# Patient Record
Sex: MALE | Race: WHITE | NOT HISPANIC OR LATINO | URBAN - METROPOLITAN AREA
[De-identification: names, ages, dates, MRNs, and addresses within clinical notes are randomized per-mention and may not be internally consistent; named-entity substitution may affect disease eponyms.]

---

## 2019-06-21 ENCOUNTER — OUTPATIENT (OUTPATIENT)
Dept: OUTPATIENT SERVICES | Facility: HOSPITAL | Age: 34
LOS: 1 days | End: 2019-06-21
Payer: COMMERCIAL

## 2019-06-21 VITALS
HEIGHT: 71 IN | OXYGEN SATURATION: 99 % | SYSTOLIC BLOOD PRESSURE: 116 MMHG | HEART RATE: 58 BPM | WEIGHT: 169.98 LBS | DIASTOLIC BLOOD PRESSURE: 66 MMHG | RESPIRATION RATE: 16 BRPM | TEMPERATURE: 98 F

## 2019-06-21 DIAGNOSIS — Z98.52 VASECTOMY STATUS: Chronic | ICD-10-CM

## 2019-06-21 DIAGNOSIS — Z01.818 ENCOUNTER FOR OTHER PREPROCEDURAL EXAMINATION: ICD-10-CM

## 2019-06-21 DIAGNOSIS — N50.89 OTHER SPECIFIED DISORDERS OF THE MALE GENITAL ORGANS: ICD-10-CM

## 2019-06-21 PROCEDURE — G0463: CPT

## 2019-06-21 RX ORDER — LIDOCAINE HCL 20 MG/ML
0.2 VIAL (ML) INJECTION ONCE
Refills: 0 | Status: DISCONTINUED | OUTPATIENT
Start: 2019-06-26 | End: 2019-07-11

## 2019-06-21 RX ORDER — CHLORHEXIDINE GLUCONATE 213 G/1000ML
1 SOLUTION TOPICAL DAILY
Refills: 0 | Status: DISCONTINUED | OUTPATIENT
Start: 2019-06-26 | End: 2019-07-11

## 2019-06-21 RX ORDER — SODIUM CHLORIDE 9 MG/ML
3 INJECTION INTRAMUSCULAR; INTRAVENOUS; SUBCUTANEOUS EVERY 8 HOURS
Refills: 0 | Status: DISCONTINUED | OUTPATIENT
Start: 2019-06-26 | End: 2019-07-11

## 2019-06-21 NOTE — H&P PST ADULT - NSICDXPROBLEM_GEN_ALL_CORE_FT
PROBLEM DIAGNOSES  Problem: Other specified disorders of the male genital organs  Assessment and Plan: Bilateral microsurgical epididymal sperm aspiration, PSt instruction given , no blood test done in PSt  has appointment with Dr Sena today for blood test as stated by patient

## 2019-06-21 NOTE — H&P PST ADULT - NSANTHOSAYNRD_GEN_A_CORE
No. EUNICE screening performed.  STOP BANG Legend: 0-2 = LOW Risk; 3-4 = INTERMEDIATE Risk; 5-8 = HIGH Risk

## 2019-06-21 NOTE — H&P PST ADULT - HISTORY OF PRESENT ILLNESS
34 year old male came in with wife for PSt today for bilateral microsurgical epididymal sperm aspiration. Patient has hx of vasectomy 2012 , decided to have more children and was referred to Dr Sena , evaluated and now scheduled this procedure for surgical intervention/ treatment.

## 2019-06-25 ENCOUNTER — TRANSCRIPTION ENCOUNTER (OUTPATIENT)
Age: 34
End: 2019-06-25

## 2019-06-25 RX ORDER — ONDANSETRON 8 MG/1
4 TABLET, FILM COATED ORAL ONCE
Refills: 0 | Status: DISCONTINUED | OUTPATIENT
Start: 2019-06-26 | End: 2019-07-11

## 2019-06-25 RX ORDER — OXYCODONE HYDROCHLORIDE 5 MG/1
5 TABLET ORAL ONCE
Refills: 0 | Status: DISCONTINUED | OUTPATIENT
Start: 2019-06-26 | End: 2019-06-26

## 2019-06-25 RX ORDER — CELECOXIB 200 MG/1
200 CAPSULE ORAL ONCE
Refills: 0 | Status: DISCONTINUED | OUTPATIENT
Start: 2019-06-26 | End: 2019-07-11

## 2019-06-25 RX ORDER — SODIUM CHLORIDE 9 MG/ML
1000 INJECTION, SOLUTION INTRAVENOUS
Refills: 0 | Status: DISCONTINUED | OUTPATIENT
Start: 2019-06-26 | End: 2019-07-11

## 2019-06-26 ENCOUNTER — APPOINTMENT (OUTPATIENT)
Dept: HUMAN REPRODUCTION | Facility: CLINIC | Age: 34
End: 2019-06-26
Payer: COMMERCIAL

## 2019-06-26 ENCOUNTER — TRANSCRIPTION ENCOUNTER (OUTPATIENT)
Age: 34
End: 2019-06-26

## 2019-06-26 ENCOUNTER — OUTPATIENT (OUTPATIENT)
Dept: OUTPATIENT SERVICES | Facility: HOSPITAL | Age: 34
LOS: 1 days | End: 2019-06-26
Payer: COMMERCIAL

## 2019-06-26 VITALS
SYSTOLIC BLOOD PRESSURE: 126 MMHG | DIASTOLIC BLOOD PRESSURE: 78 MMHG | TEMPERATURE: 97 F | OXYGEN SATURATION: 100 % | HEART RATE: 64 BPM | RESPIRATION RATE: 18 BRPM

## 2019-06-26 VITALS
RESPIRATION RATE: 18 BRPM | DIASTOLIC BLOOD PRESSURE: 66 MMHG | SYSTOLIC BLOOD PRESSURE: 111 MMHG | OXYGEN SATURATION: 100 % | TEMPERATURE: 98 F | HEART RATE: 55 BPM | HEIGHT: 71 IN | WEIGHT: 169.98 LBS

## 2019-06-26 DIAGNOSIS — Z98.52 VASECTOMY STATUS: Chronic | ICD-10-CM

## 2019-06-26 DIAGNOSIS — N50.89 OTHER SPECIFIED DISORDERS OF THE MALE GENITAL ORGANS: ICD-10-CM

## 2019-06-26 PROCEDURE — 89264 IDENTIFY SPERM TISSUE: CPT

## 2019-06-26 PROCEDURE — 89344 STORAGE/YEAR REPROD TISSUE: CPT

## 2019-06-26 PROCEDURE — 89335 CRYOPRESERVE TESTICULAR TISS: CPT

## 2019-06-26 PROCEDURE — 89257 SPERM IDENTIFICATION: CPT | Mod: 59

## 2019-06-26 RX ORDER — ACETAMINOPHEN 500 MG
1000 TABLET ORAL ONCE
Refills: 0 | Status: COMPLETED | OUTPATIENT
Start: 2019-06-26 | End: 2019-06-26

## 2019-06-26 RX ORDER — CELECOXIB 200 MG/1
200 CAPSULE ORAL ONCE
Refills: 0 | Status: COMPLETED | OUTPATIENT
Start: 2019-06-26 | End: 2019-06-26

## 2019-06-26 RX ADMIN — Medication 1000 MILLIGRAM(S): at 10:38

## 2019-06-26 RX ADMIN — CELECOXIB 200 MILLIGRAM(S): 200 CAPSULE ORAL at 10:38

## 2019-06-26 NOTE — ASU DISCHARGE PLAN (ADULT/PEDIATRIC) - CALL YOUR DOCTOR IF YOU HAVE ANY OF THE FOLLOWING:
Swelling that gets worse/Nausea and vomiting that does not stop/Pain not relieved by Medications/Inability to tolerate liquids or foods/Fever greater than (need to indicate Fahrenheit or Celsius)/Bleeding that does not stop/Wound/Surgical Site with redness, or foul smelling discharge or pus

## 2019-06-28 PROCEDURE — 55899 UNLISTED PX MALE GENITAL SYS: CPT

## 2019-08-16 PROCEDURE — 89264 IDENTIFY SPERM TISSUE: CPT

## 2019-08-16 PROCEDURE — 89354 THAW CRYOPRSVRD REPROD TISS: CPT

## 2020-06-01 PROBLEM — J45.909 UNSPECIFIED ASTHMA, UNCOMPLICATED: Chronic | Status: ACTIVE | Noted: 2019-06-21

## 2020-06-02 ENCOUNTER — APPOINTMENT (OUTPATIENT)
Dept: ORTHOPEDIC SURGERY | Facility: CLINIC | Age: 35
End: 2020-06-02
Payer: COMMERCIAL

## 2020-06-02 VITALS — TEMPERATURE: 97.9 F

## 2020-06-02 VITALS — BODY MASS INDEX: 23.8 KG/M2 | WEIGHT: 170 LBS | HEIGHT: 71 IN

## 2020-06-02 DIAGNOSIS — S89.92XA UNSPECIFIED INJURY OF LEFT LOWER LEG, INITIAL ENCOUNTER: ICD-10-CM

## 2020-06-02 PROCEDURE — 99203 OFFICE O/P NEW LOW 30 MIN: CPT

## 2020-06-02 PROCEDURE — 73562 X-RAY EXAM OF KNEE 3: CPT | Mod: LT

## 2020-06-02 NOTE — PHYSICAL EXAM
[de-identified] : \par The following radiographs were ordered and read by me during this patients visit. I reviewed each radiograph in detail with the patient and discussed the findings as highlighted below. \par \par 3 views of the left knee were obtained today that show no acute fracture or dislocation. There is no degenerative change seen. There is no gross malalignment. No significant other obvious osseous abnormality, otherwise unremarkable.  [de-identified] : Oriented to time, place, person\par Mood: Normal\par Affect: Normal\par Appearance: Healthy, well appearing, no acute distress.\par Gait: Normal\par Assistive Devices: None\par \par Left knee exam\par \par Skin: Clean, dry, intact\par Inspection: No obvious malalignment, no masses, mild swelling, mild effusion, mechanical catching\par Pulses: 2+ DP/PT pulses\par ROM: 0-135 degrees of flexion.  Lateral pain with deep knee flexion/extension.\par Tenderness: No MJLT.  Positive LJLT. No pain over the patella facets. No pain to the quadriceps tendon. No pain to the patella tendon. No posterior knee tenderness.\par Stability: Stable to varus, valgus. IIB lachman testing.  Positive anterior drawer, negative posterior drawer.\par Strength: 5/5 Q/H/TA/GS/EHL, without atrophy\par Neuro: In tact to light touch throughout, DTR's normal\par Additional tests: Positive McMurrays test, Negative patellar grind test. \par

## 2020-06-02 NOTE — DISCUSSION/SUMMARY
[de-identified] : 35-year-old male with left knee injury\par \par Patient presents with an injury to the left knee that is consistent with a anterior cruciate ligament injury.  There does appear to be insufficiency to the ACL on clinical examination compared to the contralateral side.  Patient also has a mechanical clunk with examination of the knee especially in flexion.  There is concern for additional meniscal injury radiating through the lateral compartment.\par \par This was discussed in detail with the patient, and would explain his current symptoms.  Recommendation is for MRI imaging of the left knee to determine degree of internal derangement and to stratify him for possible surgical reconstruction.  Patient voiced understanding regarding short-term/long-term prognosis.\par \par Continue ice/NSAIDs, weightbearing as tolerated.  Avoid impact loading activity.\par \par Follow-up after MRI

## 2020-06-02 NOTE — HISTORY OF PRESENT ILLNESS
[de-identified] : 35 year old male hx of bilateral knee pain presents today with left knee pain x 2 weeks. He was jumping on trampoline, slipped on the some water and fell backwards. He started to have pain the following day and was unable put weight on it. Reports instability to the left knee. The pain is brought on with pivoting and abduction of the leg. He is not taking pain medication. Denies locking, catching, numbness or tingling. Localizes pain mainly to the posterior aspect of the knee and occasionally on the lateral part. \par \par The patient's past medical history, past surgical history, medications and allergies were reviewed by me today with the patient and documented accordingly. In addition, the patient's family and social history, which were noncontributory to this visit, were reviewed also.

## 2020-06-15 ENCOUNTER — OUTPATIENT (OUTPATIENT)
Dept: OUTPATIENT SERVICES | Facility: HOSPITAL | Age: 35
LOS: 1 days | End: 2020-06-15
Payer: COMMERCIAL

## 2020-06-15 ENCOUNTER — APPOINTMENT (OUTPATIENT)
Dept: MRI IMAGING | Facility: CLINIC | Age: 35
End: 2020-06-15
Payer: COMMERCIAL

## 2020-06-15 ENCOUNTER — RESULT REVIEW (OUTPATIENT)
Age: 35
End: 2020-06-15

## 2020-06-15 DIAGNOSIS — S89.92XA UNSPECIFIED INJURY OF LEFT LOWER LEG, INITIAL ENCOUNTER: ICD-10-CM

## 2020-06-15 DIAGNOSIS — Z98.52 VASECTOMY STATUS: Chronic | ICD-10-CM

## 2020-06-15 PROCEDURE — 73721 MRI JNT OF LWR EXTRE W/O DYE: CPT

## 2020-06-15 PROCEDURE — 73721 MRI JNT OF LWR EXTRE W/O DYE: CPT | Mod: 26,LT

## 2020-06-19 ENCOUNTER — APPOINTMENT (OUTPATIENT)
Dept: ORTHOPEDIC SURGERY | Facility: CLINIC | Age: 35
End: 2020-06-19
Payer: COMMERCIAL

## 2020-06-19 PROCEDURE — 99214 OFFICE O/P EST MOD 30 MIN: CPT | Mod: 95

## 2020-06-25 NOTE — DISCUSSION/SUMMARY
[de-identified] : 35-year-old male with left knee injury\par \par On initial presentation the patient had injury to the left knee consistent with possible meniscal versus ligamentous injury.  An MRI was obtained that shows a complex tear through the posterior horn medial meniscus, without evidence of high-grade injury to the cruciate ligaments.  Given his mechanical conditions, recommendation would be for surgical intervention with debridement versus repair of the medial meniscus to stabilize the knee.  Symptoms were radiating through the lateral compartment on presentation, but the lateral meniscus appears intact.\par \par All risks, benefits and alternatives to the proposed surgical procedure, left knee arthroscopy partial meniscectomy, as well as the need for formal post-operative rehabilitation were discussed in great detail with the patient. Risks include but are not limited to pain, bleeding, infection, neurovascular injury, stiffness, [default value], medical complications (including DVT, PE, MI), and risks of anesthesia. \par \par A discussion was also had with the patient regarding additional risk given recent Covid-19 pandemic; including the potential additional risk of anesthesia and any medical comorbidities that the patient has.  It was made clear to the patient that we are taking all precautions regarding Covid-19 with regards to surgical scheduling and perioperative care.  The patient understands that current protocol requires Covid-19 testing no more than 48hrs prior to surgery, and PST's may be performed onsite at the day of surgery. \par \par The patient expressed understanding and all questions were answered. The patient is electing to proceed, and will have the patient scheduled accordingly.

## 2020-06-25 NOTE — PHYSICAL EXAM
[de-identified] : Oriented to time, place, person\par Mood: Normal\par Affect: Normal\par Appearance: Healthy, well appearing, no acute distress.\par Gait: Normal\par Assistive Devices: None\par \par Left knee exam\par \par Skin: Clean, dry, intact\par Inspection: No obvious malalignment, no masses, mild swelling, mild effusion, mechanical catching\par Pulses: Pink perfused\par ROM: 0-135 degrees of flexion.  Lateral pain with deep knee flexion/extension.\par Tenderness: No MJLT.  Positive LJLT. No pain over the patella facets. No pain to the quadriceps tendon. No pain to the patella tendon. No posterior knee tenderness.\par Stability: Stable to varus, valgus. IIA lachman testing.  Positive anterior drawer, negative posterior drawer.\par Strength: 5/5 Q/H/TA/GS/EHL, without atrophy\par Neuro: In tact to light touch throughout, DTR's normal\par Additional tests: Positive McMurrays test, Negative patellar grind test. \par  [de-identified] : 3 views of the left knee were obtained 6.2.20 that show no acute fracture or dislocation. There is no degenerative change seen. There is no gross malalignment. No significant other obvious osseous abnormality, otherwise unremarkable. \par \par MRI left knee dated 6.15.20 shows evidence of a complex tear through the posterior horn medial meniscus.  Focal chondral loss nonweightbearing medial femoral condyle.

## 2020-06-25 NOTE — HISTORY OF PRESENT ILLNESS
[Home] : at home, [unfilled] , at the time of the visit. [Verbal consent obtained from patient] : the patient, [unfilled] [Other Location: e.g. Home (Enter Location, City,State)___] : at [unfilled] [FreeTextEntry4] : Reed Snyder [de-identified] : 35 year old male presents today for follow up left knee pain/instability. MRI was obtained and he presents for further treatment. He was jumping on trampoline 2 weeks prior to initial presentation and slipped on the some water and fell backwards. He started to have pain the following day and was unable put weight on it. Reports instability to the left knee. The pain is brought on with pivoting and abduction of the leg. He is not taking pain medication. Denies locking, catching, numbness or tingling. Localizes pain mainly to the posterior aspect of the knee and occasionally on the lateral part. MRI obtained given concern for possible ligamentous injury vs. meniscal injury. Presents today for review of imaging.

## 2020-06-30 DIAGNOSIS — Z01.818 ENCOUNTER FOR OTHER PREPROCEDURAL EXAMINATION: ICD-10-CM

## 2020-07-01 RX ORDER — ACETAMINOPHEN 500 MG
2 TABLET ORAL
Qty: 0 | Refills: 0 | DISCHARGE

## 2020-07-02 ENCOUNTER — OUTPATIENT (OUTPATIENT)
Dept: OUTPATIENT SERVICES | Facility: HOSPITAL | Age: 35
LOS: 1 days | End: 2020-07-02

## 2020-07-02 VITALS
SYSTOLIC BLOOD PRESSURE: 112 MMHG | RESPIRATION RATE: 14 BRPM | DIASTOLIC BLOOD PRESSURE: 78 MMHG | HEIGHT: 70.5 IN | HEART RATE: 60 BPM | WEIGHT: 175.05 LBS | OXYGEN SATURATION: 99 % | TEMPERATURE: 97 F

## 2020-07-02 DIAGNOSIS — S83.242A OTHER TEAR OF MEDIAL MENISCUS, CURRENT INJURY, LEFT KNEE, INITIAL ENCOUNTER: ICD-10-CM

## 2020-07-02 DIAGNOSIS — Z98.52 VASECTOMY STATUS: Chronic | ICD-10-CM

## 2020-07-02 LAB
HCT VFR BLD CALC: 45 % — SIGNIFICANT CHANGE UP (ref 39–50)
HGB BLD-MCNC: 14.9 G/DL — SIGNIFICANT CHANGE UP (ref 13–17)
MCHC RBC-ENTMCNC: 29.8 PG — SIGNIFICANT CHANGE UP (ref 27–34)
MCHC RBC-ENTMCNC: 33.1 % — SIGNIFICANT CHANGE UP (ref 32–36)
MCV RBC AUTO: 90 FL — SIGNIFICANT CHANGE UP (ref 80–100)
NRBC # FLD: 0 K/UL — SIGNIFICANT CHANGE UP (ref 0–0)
PLATELET # BLD AUTO: 184 K/UL — SIGNIFICANT CHANGE UP (ref 150–400)
PMV BLD: 12.3 FL — SIGNIFICANT CHANGE UP (ref 7–13)
RBC # BLD: 5 M/UL — SIGNIFICANT CHANGE UP (ref 4.2–5.8)
RBC # FLD: 12.9 % — SIGNIFICANT CHANGE UP (ref 10.3–14.5)
WBC # BLD: 5.81 K/UL — SIGNIFICANT CHANGE UP (ref 3.8–10.5)
WBC # FLD AUTO: 5.81 K/UL — SIGNIFICANT CHANGE UP (ref 3.8–10.5)

## 2020-07-02 NOTE — H&P PST ADULT - RS GEN PE MLT RESP DETAILS PC
no subcutaneous emphysema/breath sounds equal/no rhonchi/clear to auscultation bilaterally/no rales/respirations non-labored

## 2020-07-02 NOTE — H&P PST ADULT - NSICDXPROBLEM_GEN_ALL_CORE_FT
PROBLEM DIAGNOSES  Problem: Other tear of medial meniscus, current injury, left knee, initial encounter  Assessment and Plan: Left knee arthroscopic partial medical meniscectomy   Chlorhexidine wash with verbal and written instructions, teach back appropriate   Famotidine given as pr J recommendation

## 2020-07-02 NOTE — H&P PST ADULT - HISTORY OF PRESENT ILLNESS
This is a 35 y.o. male with left knee pain. Pt was evaluated , had x-ray of left knee , MRI of left knee . Pt has other tear of medial meniscus , current injury , left knee , initial encounter . Pt now for surgery .

## 2020-07-02 NOTE — H&P PST ADULT - LYMPHATIC
posterior cervical R/supraclavicular R/posterior cervical L/supraclavicular L/anterior cervical R/anterior cervical L

## 2020-07-02 NOTE — H&P PST ADULT - MUSCULOSKELETAL
no joint erythema/ROM intact/no joint swelling/no joint warmth/no calf tenderness details… detailed exam

## 2020-07-04 ENCOUNTER — APPOINTMENT (OUTPATIENT)
Dept: DISASTER EMERGENCY | Facility: CLINIC | Age: 35
End: 2020-07-04

## 2020-07-04 LAB — SARS-COV-2 N GENE NPH QL NAA+PROBE: NOT DETECTED

## 2020-07-06 ENCOUNTER — TRANSCRIPTION ENCOUNTER (OUTPATIENT)
Age: 35
End: 2020-07-06

## 2020-07-06 RX ORDER — ASPIRIN 325 MG/1
325 TABLET, FILM COATED ORAL DAILY
Qty: 28 | Refills: 0 | Status: ACTIVE | COMMUNITY
Start: 2020-07-06 | End: 1900-01-01

## 2020-07-06 RX ORDER — OXYCODONE AND ACETAMINOPHEN 5; 325 MG/1; MG/1
5-325 TABLET ORAL
Qty: 30 | Refills: 0 | Status: ACTIVE | COMMUNITY
Start: 2020-07-06 | End: 1900-01-01

## 2020-07-07 ENCOUNTER — OUTPATIENT (OUTPATIENT)
Dept: OUTPATIENT SERVICES | Facility: HOSPITAL | Age: 35
LOS: 1 days | Discharge: HOME | End: 2020-07-07
Payer: COMMERCIAL

## 2020-07-07 ENCOUNTER — APPOINTMENT (OUTPATIENT)
Dept: ORTHOPEDIC SURGERY | Facility: AMBULATORY SURGERY CENTER | Age: 35
End: 2020-07-07

## 2020-07-07 VITALS
TEMPERATURE: 98 F | SYSTOLIC BLOOD PRESSURE: 111 MMHG | HEART RATE: 56 BPM | WEIGHT: 175.05 LBS | RESPIRATION RATE: 18 BRPM | HEIGHT: 70.5 IN | OXYGEN SATURATION: 100 % | DIASTOLIC BLOOD PRESSURE: 58 MMHG

## 2020-07-07 VITALS
TEMPERATURE: 98 F | OXYGEN SATURATION: 100 % | HEART RATE: 63 BPM | RESPIRATION RATE: 14 BRPM | SYSTOLIC BLOOD PRESSURE: 121 MMHG | DIASTOLIC BLOOD PRESSURE: 74 MMHG

## 2020-07-07 DIAGNOSIS — Z98.52 VASECTOMY STATUS: Chronic | ICD-10-CM

## 2020-07-07 DIAGNOSIS — S83.242A OTHER TEAR OF MEDIAL MENISCUS, CURRENT INJURY, LEFT KNEE, INITIAL ENCOUNTER: ICD-10-CM

## 2020-07-07 PROCEDURE — 29881 ARTHRS KNE SRG MNISECTMY M/L: CPT | Mod: LT

## 2020-07-07 RX ORDER — SODIUM CHLORIDE 9 MG/ML
1000 INJECTION, SOLUTION INTRAVENOUS
Refills: 0 | Status: DISCONTINUED | OUTPATIENT
Start: 2020-07-07 | End: 2020-07-22

## 2020-07-07 NOTE — ASU DISCHARGE PLAN (ADULT/PEDIATRIC) - CALL YOUR DOCTOR IF YOU HAVE ANY OF THE FOLLOWING:
Nausea and vomiting that does not stop/Numbness, tingling, color or temperature change to extremity/Bleeding that does not stop/Wound/Surgical Site with redness, or foul smelling discharge or pus/Pain not relieved by Medications

## 2020-07-07 NOTE — ASU DISCHARGE PLAN (ADULT/PEDIATRIC) - CARE PROVIDER_API CALL
Reed Snyder  [facility]TH[facility]ED[facility]  [address] [place] [address]  Phone: [phone]  Fax: [phone]  Follow Up Time:

## 2020-07-20 ENCOUNTER — APPOINTMENT (OUTPATIENT)
Dept: ORTHOPEDIC SURGERY | Facility: CLINIC | Age: 35
End: 2020-07-20
Payer: COMMERCIAL

## 2020-07-20 VITALS — TEMPERATURE: 98 F

## 2020-07-20 DIAGNOSIS — S83.232D COMPLEX TEAR OF MEDIAL MENISCUS, CURRENT INJURY, LEFT KNEE, SUBSEQUENT ENCOUNTER: ICD-10-CM

## 2020-07-20 PROCEDURE — 99024 POSTOP FOLLOW-UP VISIT: CPT

## 2020-07-20 NOTE — HISTORY OF PRESENT ILLNESS
[0] : no pain reported [Chills] : no chills [Fever] : no fever [Nausea] : no nausea [Vomiting] : no vomiting [Clean/Dry/Intact] : clean, dry and intact [Healed] : healed [Erythema] : not erythematous [Discharge] : absent of discharge [Swelling] : swollen [Dehiscence] : not dehisced [Neuro Intact] : an unremarkable neurological exam [Vascular Intact] : ~T peripheral vascular exam normal [Doing Well] : is doing well [No Sign of Infection] : is showing no signs of infection [Excellent Pain Control] : has excellent pain control [Sutures Removed] : sutures were removed [Steri-Strips Removed & Replaced] : steri-strips removed and replaced [de-identified] : 35-year-old male status post left knee PMM 7/7/20 [de-identified] : 35-year-old male status post left knee PMM. He is doing well. Reports slight stiffness in the knee. Denies post op complications.  [de-identified] : Left knee exam\par \par Skin: Incision(s) clean, dry, intact, no drainage, healed\par Inspection: Residual swelling, min residual effusion\par Pulses: 2+ DP/PT pulses\par ROM: 0-130\par Tenderness: Mild medial\par Stability: Stable\par Strength: Intact Q/H/TA/GS/EHL\par Neuro: In tact to light touch throughout [de-identified] : 35-year-old male status post left knee PMM\par \par The patient presents for the first postoperative visit following partial meniscectomy. All intraoperative imaging was discussed in detail with the patient including the quality and nature of the meniscus injury as well as the quality of the chondral surfaces. Discussion was also centered upon the rehabilitation required following arthroscopy, and patient is agreeable to treatment plan.\par \par Recommendations:\par 1. PT: WBAT, AAROM/AROM to tolerance with immediate full ROM as goal. \par 2. Therapeutic exercises: Quad/Hamstring sets, co-contractions, SLR, HEP.\par 3. Meds: Wean pain medication, transition to OTC NSAID's/tylenol as tolerated\par 4. Bracing: None\par \par Follow up 4wks for re-evaluation.

## 2020-07-27 ENCOUNTER — RX RENEWAL (OUTPATIENT)
Age: 35
End: 2020-07-27

## 2021-06-25 ENCOUNTER — APPOINTMENT (OUTPATIENT)
Dept: ORTHOPEDIC SURGERY | Facility: CLINIC | Age: 36
End: 2021-06-25

## 2022-03-17 NOTE — ASU PREOP CHECKLIST - AS TEMP SITE
Return call from Josi Goldsmith, daughter. CMcalled back re: family training to be confirmed for tomorrow. Also, if cannot reach Josi Goldsmith, she said Prasanna Lamb (family member), Electronically signed by Maria G Puga RN on 3/17/2022 at 12:39 PM    Addendum:   Chauncey Mcpherson returned call and he will come to family training tomorrow (Friday) at 9:30 am. This CM informing therapy. Chauncey Mcpherson, as well, stated that patient will have 24 hour supervision at home. Electronically signed by Maria G Puga RN on 3/17/2022 at 12:42 PM     Addendum:  Family has decided with patient not to have home care and instead to go to OP therapy. Josi Goldsmith, daughter, said they will make sure patient gets to OP therapy appointments. Electronically signed by Maria G Puga RN on 3/17/2022 at 2:31 PM     Addendum:  Lonnie Garay for delivery of RW and TTB.  Electronically signed by Maria G Puga RN on 3/17/2022 at 3:48 PM oral

## 2022-07-07 ENCOUNTER — APPOINTMENT (OUTPATIENT)
Dept: PEDIATRIC ORTHOPEDIC SURGERY | Facility: CLINIC | Age: 37
End: 2022-07-07

## 2022-07-07 VITALS — HEIGHT: 71 IN | WEIGHT: 175 LBS | BODY MASS INDEX: 24.5 KG/M2

## 2022-07-07 DIAGNOSIS — Z78.9 OTHER SPECIFIED HEALTH STATUS: ICD-10-CM

## 2022-07-07 DIAGNOSIS — Z72.89 OTHER PROBLEMS RELATED TO LIFESTYLE: ICD-10-CM

## 2022-07-07 DIAGNOSIS — Z82.49 FAMILY HISTORY OF ISCHEMIC HEART DISEASE AND OTHER DISEASES OF THE CIRCULATORY SYSTEM: ICD-10-CM

## 2022-07-07 PROCEDURE — 20610 DRAIN/INJ JOINT/BURSA W/O US: CPT

## 2022-07-07 PROCEDURE — 99202 OFFICE O/P NEW SF 15 MIN: CPT | Mod: 25

## 2022-07-07 PROCEDURE — 73030 X-RAY EXAM OF SHOULDER: CPT

## 2022-07-07 RX ORDER — DICLOFENAC SODIUM 75 MG/1
75 TABLET, DELAYED RELEASE ORAL TWICE DAILY
Qty: 60 | Refills: 1 | Status: ACTIVE | COMMUNITY
Start: 2022-07-07 | End: 1900-01-01

## 2022-07-07 NOTE — PROCEDURE
[FreeTextEntry1] : Under sterile technique with a Betadine prep the right shoulder subacromial space was injected from a lateral portal with a 21-gauge needle with 80 mg of Kenalog and 8 cc of 1% lidocaine with a Band-Aid dressing applied at the conclusion this was tolerated without incident

## 2022-07-07 NOTE — ASSESSMENT
[FreeTextEntry1] : Impression: Strain right rotator cuff rule out tear.\par \par This patient will be referred for formal physical therapy.  I have placed him on diclofenac with GI precautions.  The subacromial space has been injected with Kenalog and lidocaine.  He will return in approximately 5 weeks after the therapy has been completed if there is no significant progress MRI is likely to become necessary

## 2022-07-07 NOTE — PHYSICAL EXAM
[de-identified] : Exam today reveals normal motion to the cervical spine in all planes without spasm or tenderness.  The right shoulder has no obvious atrophy.  He does have an obvious arc of impingement with moderate restriction of full forward flexion abduction in the scapular plane.  There is also restricted rotation both in/out.  Positive Neer and Donohue sign.  Negative Vero.  There is no evidence of obvious instability on stress.  His strength is restricted certainly at least on the basis of pain.\par \par X-rays ordered and taken of the right shoulder today reveal no obvious bony abnormality.

## 2022-07-07 NOTE — HISTORY OF PRESENT ILLNESS
[de-identified] : This 37-year-old right-handed pleasant gentleman is seen today for evaluation of his right shoulder.  He states he was well until the end of May when he was performing yard work at home and this precipitated acute pain and stiffness to his right shoulder.  He did not seek attention as he thought this was all resolved.  However over time his stiffness has gotten worse and he has had difficulty with placement and use of his arm up in space.  He has trouble sleeping at night and cannot sleep on his right side.  His pain does not radiate distally there is no associated neck pain numbness or paresthesias.  He has not noted any obvious clicking or popping and he has not had a sensation of instability.  Prior to this no complaints for the shoulder.

## 2022-08-11 ENCOUNTER — APPOINTMENT (OUTPATIENT)
Dept: PEDIATRIC ORTHOPEDIC SURGERY | Facility: CLINIC | Age: 37
End: 2022-08-11

## 2022-09-08 RX ORDER — DICLOFENAC SODIUM 75 MG/1
75 TABLET, DELAYED RELEASE ORAL TWICE DAILY
Qty: 60 | Refills: 1 | Status: ACTIVE | COMMUNITY
Start: 2022-09-08 | End: 1900-01-01

## 2022-10-12 ENCOUNTER — APPOINTMENT (OUTPATIENT)
Dept: PEDIATRIC ORTHOPEDIC SURGERY | Facility: CLINIC | Age: 37
End: 2022-10-12

## 2022-10-12 DIAGNOSIS — S46.011A STRAIN OF MUSCLE(S) AND TENDON(S) OF THE ROTATOR CUFF OF RIGHT SHOULDER, INITIAL ENCOUNTER: ICD-10-CM

## 2022-10-12 PROCEDURE — 99212 OFFICE O/P EST SF 10 MIN: CPT

## 2022-10-12 RX ORDER — DICLOFENAC SODIUM 75 MG/1
75 TABLET, DELAYED RELEASE ORAL TWICE DAILY
Qty: 60 | Refills: 1 | Status: ACTIVE | COMMUNITY
Start: 2022-10-12 | End: 1900-01-01

## 2022-10-12 NOTE — HISTORY OF PRESENT ILLNESS
[de-identified] : This 37-year-old returns for follow-up of his right shoulder.  He was doing very nicely until just recently when he started exercising and this precipitated recurrent discomfort and stiffness no clicking or popping on motion his pain does not radiate distally

## 2022-10-12 NOTE — PHYSICAL EXAM
[de-identified] : His exam reveals he does still maintain good motion slight discomfort and slight restriction of full abduction and external rotation.  Negative [place]/Vero.  Mildly positive Neer.  Strength is good.  He has mild discomfort in the subacromial space none so over the greater tuberosity

## 2022-10-12 NOTE — ASSESSMENT
[FreeTextEntry1] : Impression: Strain right rotator cuff.\par \par He will go back to physical therapy I have renewed his diclofenac return as necessary

## 2022-10-31 ENCOUNTER — APPOINTMENT (OUTPATIENT)
Dept: PULMONOLOGY | Facility: CLINIC | Age: 37
End: 2022-10-31

## 2022-10-31 VITALS
WEIGHT: 175 LBS | HEART RATE: 83 BPM | SYSTOLIC BLOOD PRESSURE: 120 MMHG | HEIGHT: 71 IN | OXYGEN SATURATION: 97 % | BODY MASS INDEX: 24.5 KG/M2 | DIASTOLIC BLOOD PRESSURE: 80 MMHG | TEMPERATURE: 97 F

## 2022-10-31 PROCEDURE — 99204 OFFICE O/P NEW MOD 45 MIN: CPT

## 2022-10-31 NOTE — HISTORY OF PRESENT ILLNESS
Chief Complaint   Patient presents with     Colposcopy        [FreeTextEntry1] : Evaluation for possible sleep apnea. [de-identified] : This is a 37-year-old male who states that his wife is complaining about loud snoring. She also notices witnessed apneas. She is states she sees him gasping for air. He sleeps from 10 PM to 6 AM and generally sleeps through the night. However he is not rested in the morning and is somewhat sleepy during the day he has frequent headaches in the morning. His [place] sleepiness score is 14. Past medical history none. Meds none. Social history nonsmoker nondrinker. He works in [employer].

## 2022-10-31 NOTE — ASSESSMENT
[FreeTextEntry1] : Patient with a high likelihood of significant sleep apnea based on the history. We'll order a  sleep study.

## 2022-10-31 NOTE — PHYSICAL EXAM
[No Acute Distress] : no acute distress [Well Nourished] : well nourished [Well Developed] : well developed [Well-Appearing] : well-appearing [Normal Sclera/Conjunctiva] : normal sclera/conjunctiva [PERRL] : pupils equal round and reactive to light [EOMI] : extraocular movements intact [Normal Outer Ear/Nose] : the outer ears and nose were normal in appearance [Normal Oropharynx] : the oropharynx was normal [No JVD] : no jugular venous distention [No Lymphadenopathy] : no lymphadenopathy [Supple] : supple [Thyroid Normal, No Nodules] : the thyroid was normal and there were no nodules present [No Respiratory Distress] : no respiratory distress  [No Accessory Muscle Use] : no accessory muscle use [Clear to Auscultation] : lungs were clear to auscultation bilaterally [Normal Rate] : normal rate  [Regular Rhythm] : with a regular rhythm [Normal S1, S2] : normal S1 and S2 [No Murmur] : no murmur heard [No Carotid Bruits] : no carotid bruits [No Abdominal Bruit] : a ~M bruit was not heard ~T in the abdomen [No Varicosities] : no varicosities [Pedal Pulses Present] : the pedal pulses are present [No Edema] : there was no peripheral edema [No Palpable Aorta] : no palpable aorta [No Extremity Clubbing/Cyanosis] : no extremity clubbing/cyanosis [Soft] : abdomen soft [Non Tender] : non-tender [Non-distended] : non-distended [No Masses] : no abdominal mass palpated [No HSM] : no HSM [Normal Bowel Sounds] : normal bowel sounds [Normal Posterior Cervical Nodes] : no posterior cervical lymphadenopathy [Normal Anterior Cervical Nodes] : no anterior cervical lymphadenopathy [No Spinal Tenderness] : no spinal tenderness [No Focal Deficits] : no focal deficits [Normal Gait] : normal gait [Normal Affect] : the affect was normal [Normal Insight/Judgement] : insight and judgment were intact

## 2023-01-11 ENCOUNTER — APPOINTMENT (OUTPATIENT)
Dept: PULMONOLOGY | Facility: CLINIC | Age: 38
End: 2023-01-11

## 2023-03-27 ENCOUNTER — APPOINTMENT (OUTPATIENT)
Dept: PULMONOLOGY | Facility: CLINIC | Age: 38
End: 2023-03-27
Payer: COMMERCIAL

## 2023-03-27 VITALS
HEART RATE: 77 BPM | SYSTOLIC BLOOD PRESSURE: 110 MMHG | TEMPERATURE: 96.9 F | HEIGHT: 71 IN | DIASTOLIC BLOOD PRESSURE: 80 MMHG | OXYGEN SATURATION: 98 % | WEIGHT: 188 LBS | BODY MASS INDEX: 26.32 KG/M2

## 2023-03-27 DIAGNOSIS — R06.83 SNORING: ICD-10-CM

## 2023-03-27 PROCEDURE — 99214 OFFICE O/P EST MOD 30 MIN: CPT

## 2023-03-27 NOTE — HISTORY OF PRESENT ILLNESS
[FreeTextEntry1] : Follow-up CPAP compliance. [de-identified] :   Patient with moderate sleep apnea with an AHI of 24.2.  He has been using CPAP every night since February 19.  The AHI is 1.6.  He states he is adjusting to it but using it regularly.  He uses a nasal mask.  He states the quality of his sleep is better and he feels more refreshed.  He is not snoring.  There are no headaches.  No excess daytime sleepiness.  He feels like he has more energy and more alert.

## 2023-03-27 NOTE — ASSESSMENT
[FreeTextEntry1] :   Patient is compliant and benefiting from the use of CPAP.  He is to continue with CPAP therapy and return to see me in 1 year.

## 2023-05-19 NOTE — ASU PATIENT PROFILE, ADULT - PATIENT KNOW
yes Valtrex Counseling: I discussed with the patient the risks of valacyclovir including but not limited to kidney damage, nausea, vomiting and severe allergy.  The patient understands that if the infection seems to be worsening or is not improving, they are to call.

## 2024-04-26 ENCOUNTER — APPOINTMENT (OUTPATIENT)
Dept: PULMONOLOGY | Facility: CLINIC | Age: 39
End: 2024-04-26
Payer: COMMERCIAL

## 2024-04-26 VITALS
BODY MASS INDEX: 25.2 KG/M2 | DIASTOLIC BLOOD PRESSURE: 70 MMHG | WEIGHT: 180 LBS | HEIGHT: 71 IN | HEART RATE: 74 BPM | SYSTOLIC BLOOD PRESSURE: 110 MMHG | OXYGEN SATURATION: 99 %

## 2024-04-26 DIAGNOSIS — G47.33 OBSTRUCTIVE SLEEP APNEA (ADULT) (PEDIATRIC): ICD-10-CM

## 2024-04-26 PROCEDURE — 99213 OFFICE O/P EST LOW 20 MIN: CPT

## 2024-04-26 NOTE — ASSESSMENT
[FreeTextEntry1] : Patient with moderate sleep apnea doing well on CPAP.  Patient is benefiting from CPAP and is completely compliant.  He will return to see me in 1 year.

## 2024-04-26 NOTE — HISTORY OF PRESENT ILLNESS
[TextBox_4] : 39-year-old for follow-up of sleep apnea.  Patient on CPAP.  He uses it every night all night.  His compliance report was reviewed and he has used it for 88 out of the last 90 days more than 4 hours for an average usage of 7 hours 26 minutes.  AHI is 0.8.  Patient sleeps well and is denies snoring or excess daytime sleepiness.  His wife is very happy with the use of CPAP as she no longer deals with his snoring.

## 2024-12-24 PROBLEM — F10.90 ALCOHOL USE: Status: ACTIVE | Noted: 2022-07-07

## 2025-04-23 ENCOUNTER — NON-APPOINTMENT (OUTPATIENT)
Age: 40
End: 2025-04-23

## 2025-04-25 ENCOUNTER — APPOINTMENT (OUTPATIENT)
Dept: PULMONOLOGY | Facility: CLINIC | Age: 40
End: 2025-04-25
Payer: COMMERCIAL

## 2025-04-25 VITALS
SYSTOLIC BLOOD PRESSURE: 110 MMHG | WEIGHT: 190 LBS | DIASTOLIC BLOOD PRESSURE: 70 MMHG | HEART RATE: 77 BPM | BODY MASS INDEX: 26.6 KG/M2 | HEIGHT: 71 IN | OXYGEN SATURATION: 98 %

## 2025-04-25 DIAGNOSIS — G47.33 OBSTRUCTIVE SLEEP APNEA (ADULT) (PEDIATRIC): ICD-10-CM

## 2025-04-25 PROCEDURE — 99213 OFFICE O/P EST LOW 20 MIN: CPT
